# Patient Record
Sex: MALE | Race: WHITE | NOT HISPANIC OR LATINO
[De-identification: names, ages, dates, MRNs, and addresses within clinical notes are randomized per-mention and may not be internally consistent; named-entity substitution may affect disease eponyms.]

---

## 2018-09-14 PROBLEM — Z00.00 ENCOUNTER FOR PREVENTIVE HEALTH EXAMINATION: Status: ACTIVE | Noted: 2018-09-14

## 2018-09-18 ENCOUNTER — NON-APPOINTMENT (OUTPATIENT)
Age: 53
End: 2018-09-18

## 2018-09-18 ENCOUNTER — LABORATORY RESULT (OUTPATIENT)
Age: 53
End: 2018-09-18

## 2018-09-18 ENCOUNTER — APPOINTMENT (OUTPATIENT)
Dept: HEART AND VASCULAR | Facility: CLINIC | Age: 53
End: 2018-09-18
Payer: COMMERCIAL

## 2018-09-18 VITALS
HEIGHT: 71 IN | BODY MASS INDEX: 35 KG/M2 | HEART RATE: 74 BPM | DIASTOLIC BLOOD PRESSURE: 90 MMHG | WEIGHT: 250 LBS | SYSTOLIC BLOOD PRESSURE: 156 MMHG | RESPIRATION RATE: 16 BRPM

## 2018-09-18 DIAGNOSIS — K22.2 ESOPHAGEAL OBSTRUCTION: ICD-10-CM

## 2018-09-18 DIAGNOSIS — R00.2 PALPITATIONS: ICD-10-CM

## 2018-09-18 DIAGNOSIS — Z87.898 PERSONAL HISTORY OF OTHER SPECIFIED CONDITIONS: ICD-10-CM

## 2018-09-18 DIAGNOSIS — Z87.19 PERSONAL HISTORY OF OTHER DISEASES OF THE DIGESTIVE SYSTEM: ICD-10-CM

## 2018-09-18 DIAGNOSIS — Z80.8 FAMILY HISTORY OF MALIGNANT NEOPLASM OF OTHER ORGANS OR SYSTEMS: ICD-10-CM

## 2018-09-18 DIAGNOSIS — R14.3 FLATULENCE: ICD-10-CM

## 2018-09-18 DIAGNOSIS — R06.02 SHORTNESS OF BREATH: ICD-10-CM

## 2018-09-18 DIAGNOSIS — Z86.39 PERSONAL HISTORY OF OTHER ENDOCRINE, NUTRITIONAL AND METABOLIC DISEASE: ICD-10-CM

## 2018-09-18 DIAGNOSIS — Z86.79 PERSONAL HISTORY OF OTHER DISEASES OF THE CIRCULATORY SYSTEM: ICD-10-CM

## 2018-09-18 DIAGNOSIS — R19.8 OTHER SPECIFIED SYMPTOMS AND SIGNS INVOLVING THE DIGESTIVE SYSTEM AND ABDOMEN: ICD-10-CM

## 2018-09-18 PROCEDURE — 93000 ELECTROCARDIOGRAM COMPLETE: CPT

## 2018-09-18 PROCEDURE — 99204 OFFICE O/P NEW MOD 45 MIN: CPT

## 2018-09-18 RX ORDER — DILTIAZEM HYDROCHLORIDE 300 MG/1
300 CAPSULE, COATED, EXTENDED RELEASE ORAL
Refills: 0 | Status: ACTIVE | COMMUNITY

## 2018-09-18 RX ORDER — LOSARTAN POTASSIUM 50 MG/1
50 TABLET, FILM COATED ORAL
Refills: 0 | Status: ACTIVE | COMMUNITY

## 2018-09-19 ENCOUNTER — RESULT REVIEW (OUTPATIENT)
Age: 53
End: 2018-09-19

## 2018-09-19 LAB
ALBUMIN SERPL ELPH-MCNC: 4.9 G/DL
ALP BLD-CCNC: 73 U/L
ALT SERPL-CCNC: 49 U/L
ANION GAP SERPL CALC-SCNC: 21 MMOL/L
AST SERPL-CCNC: 34 U/L
BASOPHILS # BLD AUTO: 0.11 K/UL
BASOPHILS NFR BLD AUTO: 1.4 %
BILIRUB SERPL-MCNC: 0.7 MG/DL
BUN SERPL-MCNC: 13 MG/DL
CALCIUM SERPL-MCNC: 10.5 MG/DL
CHLORIDE SERPL-SCNC: 98 MMOL/L
CO2 SERPL-SCNC: 28 MMOL/L
CREAT SERPL-MCNC: 1.19 MG/DL
EOSINOPHIL # BLD AUTO: 0.12 K/UL
EOSINOPHIL NFR BLD AUTO: 1.5 %
GLUCOSE SERPL-MCNC: 95 MG/DL
HBA1C MFR BLD HPLC: 5.8 %
HCT VFR BLD CALC: 51.5 %
HGB BLD-MCNC: 17.6 G/DL
IMM GRANULOCYTES NFR BLD AUTO: 0.4 %
LYMPHOCYTES # BLD AUTO: 2.83 K/UL
LYMPHOCYTES NFR BLD AUTO: 35.5 %
MAN DIFF?: NORMAL
MCHC RBC-ENTMCNC: 32.2 PG
MCHC RBC-ENTMCNC: 34.2 GM/DL
MCV RBC AUTO: 94.1 FL
MONOCYTES # BLD AUTO: 0.82 K/UL
MONOCYTES NFR BLD AUTO: 10.3 %
NEUTROPHILS # BLD AUTO: 4.06 K/UL
NEUTROPHILS NFR BLD AUTO: 50.9 %
PLATELET # BLD AUTO: 311 K/UL
POTASSIUM SERPL-SCNC: 4 MMOL/L
PROT SERPL-MCNC: 8 G/DL
RBC # BLD: 5.47 M/UL
RBC # FLD: 13 %
SODIUM SERPL-SCNC: 147 MMOL/L
T3RU NFR SERPL: 1.15 INDEX
T4 FREE SERPL-MCNC: 1.4 NG/DL
TSH SERPL-ACNC: 4.19 UIU/ML
WBC # FLD AUTO: 7.97 K/UL

## 2018-10-05 ENCOUNTER — APPOINTMENT (OUTPATIENT)
Dept: HEART AND VASCULAR | Facility: CLINIC | Age: 53
End: 2018-10-05
Payer: COMMERCIAL

## 2018-10-05 VITALS
HEIGHT: 71 IN | HEART RATE: 68 BPM | WEIGHT: 250 LBS | SYSTOLIC BLOOD PRESSURE: 134 MMHG | BODY MASS INDEX: 35 KG/M2 | DIASTOLIC BLOOD PRESSURE: 84 MMHG

## 2018-10-05 PROCEDURE — 93325 DOPPLER ECHO COLOR FLOW MAPG: CPT

## 2018-10-05 PROCEDURE — 93320 DOPPLER ECHO COMPLETE: CPT

## 2018-10-05 PROCEDURE — 93351 STRESS TTE COMPLETE: CPT

## 2018-10-23 ENCOUNTER — APPOINTMENT (OUTPATIENT)
Dept: HEART AND VASCULAR | Facility: CLINIC | Age: 53
End: 2018-10-23
Payer: COMMERCIAL

## 2018-10-23 VITALS
BODY MASS INDEX: 35.56 KG/M2 | SYSTOLIC BLOOD PRESSURE: 120 MMHG | HEIGHT: 71 IN | HEART RATE: 72 BPM | WEIGHT: 254 LBS | DIASTOLIC BLOOD PRESSURE: 86 MMHG | RESPIRATION RATE: 16 BRPM

## 2018-10-23 DIAGNOSIS — R07.89 OTHER CHEST PAIN: ICD-10-CM

## 2018-10-23 PROCEDURE — 99214 OFFICE O/P EST MOD 30 MIN: CPT

## 2018-10-23 RX ORDER — PANTOPRAZOLE SODIUM 40 MG/1
40 GRANULE, DELAYED RELEASE ORAL
Refills: 0 | Status: DISCONTINUED | COMMUNITY
End: 2018-10-23

## 2018-10-24 LAB
ALBUMIN SERPL ELPH-MCNC: 4.5 G/DL
ALP BLD-CCNC: 69 U/L
ALT SERPL-CCNC: 46 U/L
ANION GAP SERPL CALC-SCNC: 16 MMOL/L
AST SERPL-CCNC: 28 U/L
BASOPHILS # BLD AUTO: 0.09 K/UL
BASOPHILS NFR BLD AUTO: 1.3 %
BILIRUB SERPL-MCNC: 0.5 MG/DL
BUN SERPL-MCNC: 11 MG/DL
CALCIUM SERPL-MCNC: 10.4 MG/DL
CHLORIDE SERPL-SCNC: 102 MMOL/L
CHOLEST SERPL-MCNC: 178 MG/DL
CHOLEST/HDLC SERPL: 4.1 RATIO
CO2 SERPL-SCNC: 29 MMOL/L
CREAT SERPL-MCNC: 1.16 MG/DL
EOSINOPHIL # BLD AUTO: 0.09 K/UL
EOSINOPHIL NFR BLD AUTO: 1.3 %
GLUCOSE SERPL-MCNC: 92 MG/DL
HCT VFR BLD CALC: 52.5 %
HDLC SERPL-MCNC: 43 MG/DL
HGB BLD-MCNC: 17 G/DL
IMM GRANULOCYTES NFR BLD AUTO: 0.3 %
LDLC SERPL CALC-MCNC: 102 MG/DL
LYMPHOCYTES # BLD AUTO: 2.03 K/UL
LYMPHOCYTES NFR BLD AUTO: 28.6 %
MAN DIFF?: NORMAL
MCHC RBC-ENTMCNC: 31.8 PG
MCHC RBC-ENTMCNC: 32.4 GM/DL
MCV RBC AUTO: 98.3 FL
MONOCYTES # BLD AUTO: 0.74 K/UL
MONOCYTES NFR BLD AUTO: 10.4 %
NEUTROPHILS # BLD AUTO: 4.14 K/UL
NEUTROPHILS NFR BLD AUTO: 58.1 %
PLATELET # BLD AUTO: 333 K/UL
POTASSIUM SERPL-SCNC: 4.5 MMOL/L
PROT SERPL-MCNC: 8.1 G/DL
RBC # BLD: 5.34 M/UL
RBC # FLD: 13.1 %
SODIUM SERPL-SCNC: 147 MMOL/L
TRIGL SERPL-MCNC: 163 MG/DL
WBC # FLD AUTO: 7.11 K/UL

## 2018-10-26 ENCOUNTER — RESULT REVIEW (OUTPATIENT)
Age: 53
End: 2018-10-26

## 2018-10-26 PROBLEM — R07.89 CHEST PRESSURE: Status: ACTIVE | Noted: 2018-10-26

## 2018-11-07 ENCOUNTER — RESULT REVIEW (OUTPATIENT)
Age: 53
End: 2018-11-07

## 2018-11-07 RX ORDER — METOPROLOL SUCCINATE 100 MG/1
100 TABLET, EXTENDED RELEASE ORAL
Qty: 2 | Refills: 0 | Status: DISCONTINUED | COMMUNITY
Start: 2018-10-26 | End: 2018-11-07

## 2018-11-08 ENCOUNTER — RX RENEWAL (OUTPATIENT)
Age: 53
End: 2018-11-08

## 2018-11-08 ENCOUNTER — RECORD ABSTRACTING (OUTPATIENT)
Age: 53
End: 2018-11-08

## 2018-11-09 ENCOUNTER — MEDICATION RENEWAL (OUTPATIENT)
Age: 53
End: 2018-11-09

## 2018-11-09 RX ORDER — CAMPHOR 0.45 %
25 GEL (GRAM) TOPICAL
Qty: 2 | Refills: 0 | Status: DISCONTINUED | COMMUNITY
Start: 2018-11-08 | End: 2018-11-09

## 2018-11-09 RX ORDER — METHYLPREDNISOLONE 32 MG/1
32 TABLET ORAL
Qty: 2 | Refills: 1 | Status: DISCONTINUED | COMMUNITY
Start: 2018-10-26 | End: 2018-11-09

## 2018-11-12 ENCOUNTER — INPATIENT (INPATIENT)
Facility: HOSPITAL | Age: 53
LOS: 0 days | Discharge: HOME | DRG: 287 | End: 2018-11-13
Attending: INTERNAL MEDICINE | Admitting: INTERNAL MEDICINE
Payer: COMMERCIAL

## 2018-11-12 VITALS
DIASTOLIC BLOOD PRESSURE: 83 MMHG | HEART RATE: 84 BPM | TEMPERATURE: 98 F | HEIGHT: 71 IN | SYSTOLIC BLOOD PRESSURE: 137 MMHG | RESPIRATION RATE: 17 BRPM | WEIGHT: 244.27 LBS | OXYGEN SATURATION: 94 %

## 2018-11-12 DIAGNOSIS — Z88.8 ALLERGY STATUS TO OTHER DRUGS, MEDICAMENTS AND BIOLOGICAL SUBSTANCES: ICD-10-CM

## 2018-11-12 DIAGNOSIS — E78.5 HYPERLIPIDEMIA, UNSPECIFIED: ICD-10-CM

## 2018-11-12 DIAGNOSIS — Z91.89 OTHER SPECIFIED PERSONAL RISK FACTORS, NOT ELSEWHERE CLASSIFIED: ICD-10-CM

## 2018-11-12 DIAGNOSIS — R06.09 OTHER FORMS OF DYSPNEA: ICD-10-CM

## 2018-11-12 DIAGNOSIS — I25.10 ATHEROSCLEROTIC HEART DISEASE OF NATIVE CORONARY ARTERY WITHOUT ANGINA PECTORIS: ICD-10-CM

## 2018-11-12 DIAGNOSIS — R63.8 OTHER SYMPTOMS AND SIGNS CONCERNING FOOD AND FLUID INTAKE: ICD-10-CM

## 2018-11-12 DIAGNOSIS — I10 ESSENTIAL (PRIMARY) HYPERTENSION: ICD-10-CM

## 2018-11-12 DIAGNOSIS — M51.26 OTHER INTERVERTEBRAL DISC DISPLACEMENT, LUMBAR REGION: Chronic | ICD-10-CM

## 2018-11-12 LAB
ALBUMIN SERPL ELPH-MCNC: 4.9 G/DL — SIGNIFICANT CHANGE UP (ref 3.3–5)
ALP SERPL-CCNC: 64 U/L — SIGNIFICANT CHANGE UP (ref 40–120)
ALT FLD-CCNC: 49 U/L — HIGH (ref 10–45)
ANION GAP SERPL CALC-SCNC: 17 MMOL/L — SIGNIFICANT CHANGE UP (ref 5–17)
APTT BLD: 35 SEC — SIGNIFICANT CHANGE UP (ref 27.5–36.3)
AST SERPL-CCNC: 26 U/L — SIGNIFICANT CHANGE UP (ref 10–40)
BASOPHILS NFR BLD AUTO: 0.1 % — SIGNIFICANT CHANGE UP (ref 0–2)
BILIRUB SERPL-MCNC: 0.9 MG/DL — SIGNIFICANT CHANGE UP (ref 0.2–1.2)
BUN SERPL-MCNC: 17 MG/DL — SIGNIFICANT CHANGE UP (ref 7–23)
CALCIUM SERPL-MCNC: 10.1 MG/DL — SIGNIFICANT CHANGE UP (ref 8.4–10.5)
CHLORIDE SERPL-SCNC: 94 MMOL/L — LOW (ref 96–108)
CHOLEST SERPL-MCNC: 201 MG/DL — HIGH (ref 10–199)
CO2 SERPL-SCNC: 22 MMOL/L — SIGNIFICANT CHANGE UP (ref 22–31)
CREAT SERPL-MCNC: 0.92 MG/DL — SIGNIFICANT CHANGE UP (ref 0.5–1.3)
EOSINOPHIL NFR BLD AUTO: 0 % — SIGNIFICANT CHANGE UP (ref 0–6)
GLUCOSE SERPL-MCNC: 163 MG/DL — HIGH (ref 70–99)
HBA1C BLD-MCNC: 5.5 % — SIGNIFICANT CHANGE UP (ref 4–5.6)
HCT VFR BLD CALC: 48.9 % — SIGNIFICANT CHANGE UP (ref 39–50)
HDLC SERPL-MCNC: 57 MG/DL — SIGNIFICANT CHANGE UP
HGB BLD-MCNC: 17.1 G/DL — HIGH (ref 13–17)
INR BLD: 1.09 — SIGNIFICANT CHANGE UP (ref 0.88–1.16)
LIPID PNL WITH DIRECT LDL SERPL: 133 MG/DL — HIGH
LYMPHOCYTES # BLD AUTO: 10.5 % — LOW (ref 13–44)
MAGNESIUM SERPL-MCNC: 2.3 MG/DL — SIGNIFICANT CHANGE UP (ref 1.6–2.6)
MCHC RBC-ENTMCNC: 31.1 PG — SIGNIFICANT CHANGE UP (ref 27–34)
MCHC RBC-ENTMCNC: 35 G/DL — SIGNIFICANT CHANGE UP (ref 32–36)
MCV RBC AUTO: 89.1 FL — SIGNIFICANT CHANGE UP (ref 80–100)
MONOCYTES NFR BLD AUTO: 0.8 % — LOW (ref 2–14)
NEUTROPHILS NFR BLD AUTO: 88.6 % — HIGH (ref 43–77)
PLATELET # BLD AUTO: 305 K/UL — SIGNIFICANT CHANGE UP (ref 150–400)
POTASSIUM SERPL-MCNC: 4 MMOL/L — SIGNIFICANT CHANGE UP (ref 3.5–5.3)
POTASSIUM SERPL-SCNC: 4 MMOL/L — SIGNIFICANT CHANGE UP (ref 3.5–5.3)
PROT SERPL-MCNC: 8.4 G/DL — HIGH (ref 6–8.3)
PROTHROM AB SERPL-ACNC: 12.4 SEC — SIGNIFICANT CHANGE UP (ref 10–12.9)
RBC # BLD: 5.49 M/UL — SIGNIFICANT CHANGE UP (ref 4.2–5.8)
RBC # FLD: 12 % — SIGNIFICANT CHANGE UP (ref 10.3–16.9)
SODIUM SERPL-SCNC: 133 MMOL/L — LOW (ref 135–145)
TOTAL CHOLESTEROL/HDL RATIO MEASUREMENT: 3.5 RATIO — SIGNIFICANT CHANGE UP (ref 3.4–9.6)
TRIGL SERPL-MCNC: 54 MG/DL — SIGNIFICANT CHANGE UP (ref 10–149)
TSH SERPL-MCNC: 1.44 UIU/ML — SIGNIFICANT CHANGE UP (ref 0.35–4.94)
WBC # BLD: 8.8 K/UL — SIGNIFICANT CHANGE UP (ref 3.8–10.5)
WBC # FLD AUTO: 8.8 K/UL — SIGNIFICANT CHANGE UP (ref 3.8–10.5)

## 2018-11-12 PROCEDURE — 71045 X-RAY EXAM CHEST 1 VIEW: CPT | Mod: 26

## 2018-11-12 PROCEDURE — 95018 ALL TSTG PERQ&IQ DRUGS/BIOL: CPT | Mod: GC

## 2018-11-12 PROCEDURE — 99291 CRITICAL CARE FIRST HOUR: CPT

## 2018-11-12 PROCEDURE — 93458 L HRT ARTERY/VENTRICLE ANGIO: CPT | Mod: 26

## 2018-11-12 PROCEDURE — 93010 ELECTROCARDIOGRAM REPORT: CPT

## 2018-11-12 RX ORDER — PANTOPRAZOLE SODIUM 20 MG/1
0.5 TABLET, DELAYED RELEASE ORAL
Qty: 0 | Refills: 0 | COMMUNITY

## 2018-11-12 RX ORDER — CHLORHEXIDINE GLUCONATE 213 G/1000ML
1 SOLUTION TOPICAL DAILY
Qty: 0 | Refills: 0 | Status: DISCONTINUED | OUTPATIENT
Start: 2018-11-12 | End: 2018-11-13

## 2018-11-12 RX ORDER — FAMOTIDINE 10 MG/ML
20 INJECTION INTRAVENOUS ONCE
Qty: 0 | Refills: 0 | Status: COMPLETED | OUTPATIENT
Start: 2018-11-12 | End: 2018-11-12

## 2018-11-12 RX ORDER — PANTOPRAZOLE SODIUM 20 MG/1
20 TABLET, DELAYED RELEASE ORAL
Qty: 0 | Refills: 0 | Status: DISCONTINUED | OUTPATIENT
Start: 2018-11-12 | End: 2018-11-13

## 2018-11-12 RX ORDER — LOSARTAN POTASSIUM 100 MG/1
50 TABLET, FILM COATED ORAL DAILY
Qty: 0 | Refills: 0 | Status: DISCONTINUED | OUTPATIENT
Start: 2018-11-12 | End: 2018-11-13

## 2018-11-12 RX ORDER — INFLUENZA VIRUS VACCINE 15; 15; 15; 15 UG/.5ML; UG/.5ML; UG/.5ML; UG/.5ML
0.5 SUSPENSION INTRAMUSCULAR ONCE
Qty: 0 | Refills: 0 | Status: COMPLETED | OUTPATIENT
Start: 2018-11-12 | End: 2018-11-13

## 2018-11-12 RX ORDER — FAMOTIDINE 10 MG/ML
20 INJECTION INTRAVENOUS ONCE
Qty: 0 | Refills: 0 | Status: DISCONTINUED | OUTPATIENT
Start: 2018-11-12 | End: 2018-11-12

## 2018-11-12 RX ORDER — HEPARIN SODIUM 5000 [USP'U]/ML
5000 INJECTION INTRAVENOUS; SUBCUTANEOUS EVERY 8 HOURS
Qty: 0 | Refills: 0 | Status: DISCONTINUED | OUTPATIENT
Start: 2018-11-13 | End: 2018-11-13

## 2018-11-12 RX ORDER — DILTIAZEM HCL 120 MG
1 CAPSULE, EXT RELEASE 24 HR ORAL
Qty: 0 | Refills: 0 | COMMUNITY

## 2018-11-12 RX ORDER — DILTIAZEM HCL 120 MG
300 CAPSULE, EXT RELEASE 24 HR ORAL DAILY
Qty: 0 | Refills: 0 | Status: DISCONTINUED | OUTPATIENT
Start: 2018-11-12 | End: 2018-11-13

## 2018-11-12 RX ORDER — CLOPIDOGREL BISULFATE 75 MG/1
600 TABLET, FILM COATED ORAL ONCE
Qty: 0 | Refills: 0 | Status: COMPLETED | OUTPATIENT
Start: 2018-11-12 | End: 2018-11-12

## 2018-11-12 RX ORDER — HYDROCORTISONE 20 MG
200 TABLET ORAL ONCE
Qty: 0 | Refills: 0 | Status: DISCONTINUED | OUTPATIENT
Start: 2018-11-12 | End: 2018-11-13

## 2018-11-12 RX ORDER — DIPHENHYDRAMINE HCL 50 MG
50 CAPSULE ORAL ONCE
Qty: 0 | Refills: 0 | Status: DISCONTINUED | OUTPATIENT
Start: 2018-11-12 | End: 2018-11-13

## 2018-11-12 RX ORDER — ATORVASTATIN CALCIUM 80 MG/1
80 TABLET, FILM COATED ORAL AT BEDTIME
Qty: 0 | Refills: 0 | Status: DISCONTINUED | OUTPATIENT
Start: 2018-11-12 | End: 2018-11-13

## 2018-11-12 RX ORDER — DIPHENHYDRAMINE HCL 50 MG
25 CAPSULE ORAL ONCE
Qty: 0 | Refills: 0 | Status: COMPLETED | OUTPATIENT
Start: 2018-11-12 | End: 2018-11-12

## 2018-11-12 RX ORDER — ASPIRIN/CALCIUM CARB/MAGNESIUM 324 MG
325 TABLET ORAL ONCE
Qty: 0 | Refills: 0 | Status: DISCONTINUED | OUTPATIENT
Start: 2018-11-12 | End: 2018-11-12

## 2018-11-12 RX ORDER — HYDROCHLOROTHIAZIDE 25 MG
12.5 TABLET ORAL DAILY
Qty: 0 | Refills: 0 | Status: DISCONTINUED | OUTPATIENT
Start: 2018-11-12 | End: 2018-11-13

## 2018-11-12 RX ORDER — ASPIRIN/CALCIUM CARB/MAGNESIUM 324 MG
81 TABLET ORAL DAILY
Qty: 0 | Refills: 0 | Status: DISCONTINUED | OUTPATIENT
Start: 2018-11-13 | End: 2018-11-13

## 2018-11-12 RX ORDER — EPINEPHRINE 0.3 MG/.3ML
0.3 INJECTION INTRAMUSCULAR; SUBCUTANEOUS ONCE
Qty: 0 | Refills: 0 | Status: DISCONTINUED | OUTPATIENT
Start: 2018-11-12 | End: 2018-11-13

## 2018-11-12 RX ADMIN — FAMOTIDINE 20 MILLIGRAM(S): 10 INJECTION INTRAVENOUS at 08:48

## 2018-11-12 RX ADMIN — CLOPIDOGREL BISULFATE 600 MILLIGRAM(S): 75 TABLET, FILM COATED ORAL at 17:42

## 2018-11-12 RX ADMIN — ATORVASTATIN CALCIUM 80 MILLIGRAM(S): 80 TABLET, FILM COATED ORAL at 21:38

## 2018-11-12 RX ADMIN — Medication 25 MILLIGRAM(S): at 10:00

## 2018-11-12 NOTE — H&P ADULT - PROBLEM SELECTOR PLAN 5
F: no IVF  E: K>4 Mg>2, monitor and replete as needed  N: DASH diet w/ shellfish and peanut allergy, NPO for cath  Ppx: None  D: CCU    FULL CODE T cholesterol 201,   - C/w atorvastatin 80 mg daily

## 2018-11-12 NOTE — H&P ADULT - ASSESSMENT
53M with PMHx of HTN, HLD, intermittent asthma, GERD, Schatzki ring & hiatal hernia, and ASA allergy presents for elective cardiac cath and ASA desensitization.

## 2018-11-12 NOTE — H&P ADULT - PROBLEM SELECTOR PLAN 1
Hx of chest pain, palpitations, and SOB. Cardiac CT revealed mid LAD stenosis of calcificied and non-calcified areas. Admitted for elective cardiac cath.  - F/u PCI

## 2018-11-12 NOTE — H&P ADULT - FAMILY HISTORY
Father  Still living? No  Family history of lung cancer, Age at diagnosis: Age Unknown     Mother  Still living? No  Family history of ovarian cancer, Age at diagnosis: Age Unknown

## 2018-11-12 NOTE — H&P ADULT - PROBLEM SELECTOR PLAN 2
Hx of HTN, on diltiazem, losartan , and HCTZ at home  - Patient currently normotensive, holding BP meds for now until post-procedure

## 2018-11-12 NOTE — H&P ADULT - ATTENDING COMMENTS
Pt is a 54 y/o man c HTN, HLP, asthma, GERD with dyspnea on exertion for eval admitted for elective PCI and asa desensitization.    afebrile, 84, 113/80, 96% RA    Hgb 17.1  Plt 304  INR 1.1    ECg: nsr @ 90    ALL: angioedema to ASA    cCTA with severe mid LAD stenosis    - cont meds, dilt/losartan for cad  - pt started asa desensitization today, first dose given, cont to f/u  - pt for cath afterwards  - EF 60%

## 2018-11-12 NOTE — H&P ADULT - NSHPREVIEWOFSYSTEMS_GEN_ALL_CORE
REVIEW OF SYSTEMS:    CONSTITUTIONAL: Patient denies weakness, fevers or chills  EYES/ENT: Patient denies visual changes; denies vertigo or throat pain   NECK: Patient denies pain or stiffness  RESPIRATORY: +SHORTNESS OF BREATH, patient denies cough, wheezing, hemoptysis  CARDIOVASCULAR: +PALPITATIONS, +CHEST PAIN  GASTROINTESTINAL: Patient denies abdominal or epigastric pain, nausea, vomiting, or hematemesis, diarrhea or constipation, melena or hematochezia.  GENITOURINARY: Patient denies dysuria, frequency or hematuria  MUSCULOSKELETAL: Patient denies myalgia, arthralgia  NEUROLOGICAL: Patient denies numbness or weakness  SKIN: Patient denies itching, burning, rashes, or lesions   All other review of systems is negative unless indicated above.

## 2018-11-12 NOTE — H&P ADULT - HISTORY OF PRESENT ILLNESS
53M with PMHx of HTN, HLD, intermittent asthma, GERD, Schatzki ring & hiatal hernia, and ASA allergy presents for elective cardiac cath. Patient states for the past month, he has been experiencing shortness of breath on exertion, after 5-6 steps or 1 flight of stairs. Associated with palpitations and intermittent L sided chest pain which was different than the usual heartburn/reflux pain he gets. Outpatient EKG, stress test, holter (5 days) were nonsignificant for acute ischemic findings. Patient underwent on 11/5 Cardiac CT, which revealed severe stenosis of Mid LAD due to calcific and non-calcific plaque. Patient denies any other constitutional symptoms, denies f/c/n/v, changes in BM, changes in urinary habits, abdominal pain. ROS + for intermittent palpitations, SOB, and chest pain. Of note is patient has ASA allergy, reported 30+ years ago, patient had diffuse wheezes, rhinitis, angioedema, and hives.    Vitals wnl, labs notable for Hb 17.1, T cholesterol 201, . Patient is admitted to CCU for elective cardiac cath with ASA desensitization.

## 2018-11-12 NOTE — CHART NOTE - NSCHARTNOTEFT_GEN_A_CORE
ASPIRIN DESENSITIZATION    PATIENT REPORTELY HAS ASA -INDUCED: __angioedema__, he has a hx of asthma , but sxs after asa exposure was not consistent with asthma exacerbation.   NO HISTORY OF ASA INDUCED ANYPHYLACTOID (SYNCOPE, HYPOTENSION, NAUSEA/VOMITING)       pt has been premedicated with montelukast.   pt rec'd solumedrol 32mg po last night.   pt rec'd benadryl 25mg this morning.     all beta blockers have been held for >24hrs      subjective/  currently pt DENIES:  Increased nasal congestion or stuffiness  Watery, itchy, or red eyes  Frontal headache or sensation of sinus pain  Headache or facial pain/pressure  Cough, wheezing, or “tightness” in the chest     Informed consent was obtained and time-out was performed.    time                  symptoms/signs:  0 min       =   no reaction   with 1mg asa  20 min     =   no reaction with 3mg asa  50 min     = no reaction with  10mg asa  70 min    =  no reaction with 20mg asa  90 min   =   no reaction with 40 mg asa   110 min   = no reaction with 80 mg asa   130min = no reaction with 160 mg asa    pt received total dose of 314 mg of asa  tolerated well with out issues     ICU Vital Signs Last 24 Hrs  T(C): 37 (12 Nov 2018 16:33), Max: 37.1 (12 Nov 2018 10:00)  T(F): 98.6 (12 Nov 2018 16:33), Max: 98.7 (12 Nov 2018 10:00)  HR: 70 (12 Nov 2018 16:00) (66 - 94)  BP: 117/59 (12 Nov 2018 16:00) (111/65 - 138/86)  BP(mean): 77 (12 Nov 2018 16:00) (77 - 102)  ABP: --  ABP(mean): --  RR: 14 (12 Nov 2018 16:00) (14 - 20)  SpO2: 93% (12 Nov 2018 16:00) (93% - 97%)      Constitutional: well appearing  Eyes: non injected  ENMT: no lip/tongue or facial edema  Neck: no stridor   Respiratory:cta bl  Cardiovascular: s1 s2 nml, rrr  Gastrointestinal: soft (+) bs nt nd   Extremities: no edema  Skin:no rash, urticaria       Procedure was performed successfully without complications.  * ASPIRIN DESENSITIZATION HAS BEEN SUCCESSFUL. PATIENT CAN BE STARTED ON ASA,  UP TO__81____MG DAILY  *IF 2 DOSES OF ASA ARE MISSED, PATIENT WILL NEED TO GO THROUGH ASA DESENSITIZATION AGAIN.     Results were explained to the patient and communicated with primary team  Procedure was performed by Dr. Stanford Saha  Time face to face 200 minutes with >50% on counseling and coordination of care.

## 2018-11-12 NOTE — H&P ADULT - PMH
Chest pain, unspecified type    Duodenal ulcer    Dyspnea on exertion    Essential hypertension    Gastroesophageal reflux disease, esophagitis presence not specified    Hiatal hernia    Mild intermittent asthma without complication    Palpitation    Schatzki's ring

## 2018-11-12 NOTE — H&P ADULT - PROBLEM SELECTOR PLAN 6
1) PCP Contacted on Admission: (Y/N) --> Name & Phone #:  2) Date of Contact with PCP:  3) PCP Contacted at Discharge: (Y/N, N/A)  4) Summary of Handoff Given to PCP:   5) Post-Discharge Appointment Date and Location: F: no IVF  E: K>4 Mg>2, monitor and replete as needed  N: DASH diet w/ shellfish and peanut allergy, NPO for cath  Ppx: None  D: CCU    FULL CODE

## 2018-11-12 NOTE — PATIENT PROFILE ADULT - NSPROEDALEARNPREF_GEN_A_NUR
computer/internet/individual instruction/written material/audio/skill demonstration/verbal instruction

## 2018-11-12 NOTE — H&P ADULT - NSHPSOCIALHISTORY_GEN_ALL_CORE
Patient smokes 1 cigar every 5 years, drinks a nightcap every few days, denies any illicit drug use. Works as  for [employer].

## 2018-11-12 NOTE — H&P ADULT - PROBLEM SELECTOR PLAN 3
30 years ago patient had angioedema, shortness of breath, anaphylaxis 2/2 ASA. S/p montelukast 10 mg, and methylprednisolone  - C/w aspirin desensitization protocol  - epinephrine, benadryl at bedside

## 2018-11-12 NOTE — H&P ADULT - NSHPLABSRESULTS_GEN_ALL_CORE
LABS:                         17.1   8.8   )-----------( 305      ( 12 Nov 2018 08:07 )             48.9     11-12    133<L>  |  94<L>  |  17  ----------------------------<  163<H>  4.0   |  22  |  0.92    Ca    10.1      12 Nov 2018 08:07  Mg     2.3     11-12    TPro  8.4<H>  /  Alb  4.9  /  TBili  0.9  /  DBili  x   /  AST  26  /  ALT  49<H>  /  AlkPhos  64  11-12    PT/INR - ( 12 Nov 2018 08:07 )   PT: 12.4 sec;   INR: 1.09          PTT - ( 12 Nov 2018 08:07 )  PTT:35.0 sec        RADIOLOGY, EKG & ADDITIONAL TESTS: Reviewed.

## 2018-11-12 NOTE — H&P ADULT - NSHPPHYSICALEXAM_GEN_ALL_CORE
VITAL SIGNS:  T(C): 36.7 (11-12-18 @ 07:48), Max: 36.7 (11-12-18 @ 07:48)  T(F): 98.1 (11-12-18 @ 07:48), Max: 98.1 (11-12-18 @ 07:48)  HR: 74 (11-12-18 @ 11:00) (74 - 94)  BP: 132/79 (11-12-18 @ 11:00) (113/80 - 138/86)  BP(mean): 97 (11-12-18 @ 11:00) (90 - 102)  RR: 19 (11-12-18 @ 11:00) (16 - 20)  SpO2: 94% (11-12-18 @ 11:00) (93% - 97%)  Wt(kg): --    PHYSICAL EXAM:  Constitutional: WDWN, lying comfortably in bed, NAD  Head: Nc/At  Eyes: PERRL, EOMI, clear conjunctiva  ENT: no nasal discharge; uvula midline, no oropharyngeal erythema or exudates; MMM  Neck: supple; no JVD or thyromegaly  Respiratory: CTA b/l, no wheezes, rales, or rhonchi  Cardiac: +S1/S2, +RRR, no murmurs, rubs, or gallops  Gastrointestinal: Obese abdomen, soft, non-tender, non-distended, no rebound/guarding, no palpable masses, normoactive bowel sounds x4  Extremities: WWP, no clubbing or cyanosis, no peripheral edema  Musculoskeletal: NROM x4; no joint swelling, tenderness or erythema  Vascular: 2+ radial and DP pulses b/l  Dermatologic: skin warm, dry and intact, no rashes, wounds, or scars  Lymphatic: no submandibular or cervical LAD  Neurologic: AAOx3, CNII-XII grossly intact, no focal deficits, 5/5 strength b/l UE and LE

## 2018-11-13 ENCOUNTER — TRANSCRIPTION ENCOUNTER (OUTPATIENT)
Age: 53
End: 2018-11-13

## 2018-11-13 VITALS
DIASTOLIC BLOOD PRESSURE: 70 MMHG | SYSTOLIC BLOOD PRESSURE: 111 MMHG | OXYGEN SATURATION: 93 % | HEART RATE: 60 BPM | RESPIRATION RATE: 18 BRPM

## 2018-11-13 LAB
ANION GAP SERPL CALC-SCNC: 17 MMOL/L — SIGNIFICANT CHANGE UP (ref 5–17)
BUN SERPL-MCNC: 21 MG/DL — SIGNIFICANT CHANGE UP (ref 7–23)
CALCIUM SERPL-MCNC: 9.5 MG/DL — SIGNIFICANT CHANGE UP (ref 8.4–10.5)
CHLORIDE SERPL-SCNC: 96 MMOL/L — SIGNIFICANT CHANGE UP (ref 96–108)
CO2 SERPL-SCNC: 25 MMOL/L — SIGNIFICANT CHANGE UP (ref 22–31)
CREAT SERPL-MCNC: 1.01 MG/DL — SIGNIFICANT CHANGE UP (ref 0.5–1.3)
GLUCOSE SERPL-MCNC: 118 MG/DL — HIGH (ref 70–99)
HCT VFR BLD CALC: 47.3 % — SIGNIFICANT CHANGE UP (ref 39–50)
HGB BLD-MCNC: 16.1 G/DL — SIGNIFICANT CHANGE UP (ref 13–17)
MAGNESIUM SERPL-MCNC: 2.6 MG/DL — SIGNIFICANT CHANGE UP (ref 1.6–2.6)
MCHC RBC-ENTMCNC: 31 PG — SIGNIFICANT CHANGE UP (ref 27–34)
MCHC RBC-ENTMCNC: 34 G/DL — SIGNIFICANT CHANGE UP (ref 32–36)
MCV RBC AUTO: 91.1 FL — SIGNIFICANT CHANGE UP (ref 80–100)
PLATELET # BLD AUTO: 316 K/UL — SIGNIFICANT CHANGE UP (ref 150–400)
POTASSIUM SERPL-MCNC: 3.9 MMOL/L — SIGNIFICANT CHANGE UP (ref 3.5–5.3)
POTASSIUM SERPL-SCNC: 3.9 MMOL/L — SIGNIFICANT CHANGE UP (ref 3.5–5.3)
RBC # BLD: 5.19 M/UL — SIGNIFICANT CHANGE UP (ref 4.2–5.8)
RBC # FLD: 12.4 % — SIGNIFICANT CHANGE UP (ref 10.3–16.9)
SODIUM SERPL-SCNC: 138 MMOL/L — SIGNIFICANT CHANGE UP (ref 135–145)
WBC # BLD: 16.8 K/UL — HIGH (ref 3.8–10.5)
WBC # FLD AUTO: 16.8 K/UL — HIGH (ref 3.8–10.5)

## 2018-11-13 PROCEDURE — C1887: CPT

## 2018-11-13 PROCEDURE — 83735 ASSAY OF MAGNESIUM: CPT

## 2018-11-13 PROCEDURE — 85610 PROTHROMBIN TIME: CPT

## 2018-11-13 PROCEDURE — 83036 HEMOGLOBIN GLYCOSYLATED A1C: CPT

## 2018-11-13 PROCEDURE — 85730 THROMBOPLASTIN TIME PARTIAL: CPT

## 2018-11-13 PROCEDURE — 85025 COMPLETE CBC W/AUTO DIFF WBC: CPT

## 2018-11-13 PROCEDURE — 85027 COMPLETE CBC AUTOMATED: CPT

## 2018-11-13 PROCEDURE — C1894: CPT

## 2018-11-13 PROCEDURE — 71045 X-RAY EXAM CHEST 1 VIEW: CPT | Mod: 26

## 2018-11-13 PROCEDURE — 80053 COMPREHEN METABOLIC PANEL: CPT

## 2018-11-13 PROCEDURE — 36415 COLL VENOUS BLD VENIPUNCTURE: CPT

## 2018-11-13 PROCEDURE — 90686 IIV4 VACC NO PRSV 0.5 ML IM: CPT

## 2018-11-13 PROCEDURE — 84443 ASSAY THYROID STIM HORMONE: CPT

## 2018-11-13 PROCEDURE — C1769: CPT

## 2018-11-13 PROCEDURE — 80048 BASIC METABOLIC PNL TOTAL CA: CPT

## 2018-11-13 PROCEDURE — 93005 ELECTROCARDIOGRAM TRACING: CPT

## 2018-11-13 PROCEDURE — 71045 X-RAY EXAM CHEST 1 VIEW: CPT

## 2018-11-13 PROCEDURE — 80061 LIPID PANEL: CPT

## 2018-11-13 RX ORDER — LOSARTAN POTASSIUM 100 MG/1
1 TABLET, FILM COATED ORAL
Qty: 0 | Refills: 0 | COMMUNITY

## 2018-11-13 RX ORDER — ATORVASTATIN CALCIUM 80 MG/1
40 TABLET, FILM COATED ORAL AT BEDTIME
Qty: 0 | Refills: 0 | Status: DISCONTINUED | OUTPATIENT
Start: 2018-11-13 | End: 2018-11-13

## 2018-11-13 RX ORDER — ATORVASTATIN CALCIUM 80 MG/1
1 TABLET, FILM COATED ORAL
Qty: 0 | Refills: 0 | COMMUNITY

## 2018-11-13 RX ORDER — POTASSIUM CHLORIDE 20 MEQ
20 PACKET (EA) ORAL ONCE
Qty: 0 | Refills: 0 | Status: COMPLETED | OUTPATIENT
Start: 2018-11-13 | End: 2018-11-13

## 2018-11-13 RX ORDER — ASPIRIN/CALCIUM CARB/MAGNESIUM 324 MG
1 TABLET ORAL
Qty: 30 | Refills: 1 | OUTPATIENT
Start: 2018-11-13 | End: 2019-01-11

## 2018-11-13 RX ORDER — LOSARTAN POTASSIUM 100 MG/1
1 TABLET, FILM COATED ORAL
Qty: 0 | Refills: 0 | COMMUNITY
Start: 2018-11-13

## 2018-11-13 RX ORDER — ATORVASTATIN CALCIUM 80 MG/1
1 TABLET, FILM COATED ORAL
Qty: 30 | Refills: 1 | OUTPATIENT
Start: 2018-11-13 | End: 2019-01-11

## 2018-11-13 RX ADMIN — Medication 81 MILLIGRAM(S): at 11:10

## 2018-11-13 RX ADMIN — INFLUENZA VIRUS VACCINE 0.5 MILLILITER(S): 15; 15; 15; 15 SUSPENSION INTRAMUSCULAR at 13:25

## 2018-11-13 RX ADMIN — CHLORHEXIDINE GLUCONATE 1 APPLICATION(S): 213 SOLUTION TOPICAL at 06:00

## 2018-11-13 RX ADMIN — PANTOPRAZOLE SODIUM 20 MILLIGRAM(S): 20 TABLET, DELAYED RELEASE ORAL at 05:28

## 2018-11-13 RX ADMIN — Medication 300 MILLIGRAM(S): at 05:28

## 2018-11-13 RX ADMIN — Medication 12.5 MILLIGRAM(S): at 05:28

## 2018-11-13 RX ADMIN — Medication 20 MILLIEQUIVALENT(S): at 08:28

## 2018-11-13 RX ADMIN — HEPARIN SODIUM 5000 UNIT(S): 5000 INJECTION INTRAVENOUS; SUBCUTANEOUS at 05:28

## 2018-11-13 RX ADMIN — LOSARTAN POTASSIUM 50 MILLIGRAM(S): 100 TABLET, FILM COATED ORAL at 05:28

## 2018-11-13 NOTE — DISCHARGE NOTE ADULT - NS AS ACTIVITY OBS
Walking-Outdoors allowed/Walking-Indoors allowed/Return to Work/School allowed/Please advance your activity as tolerated.

## 2018-11-13 NOTE — DISCHARGE NOTE ADULT - SECONDARY DIAGNOSIS.
Essential hypertension Gastroesophageal reflux disease, esophagitis presence not specified Hyperlipidemia

## 2018-11-13 NOTE — CHART NOTE - NSCHARTNOTEFT_GEN_A_CORE
Patient does not have a documented allergy for eggs. Patient reports that whenever he eats eggs, he gets some stomach/chest tightness. Patient has never experienced any cutaneous and/or respiratory symptoms from eating eggs. Patient also states he received the flu vaccine February 2018 with no major complication. Patient was explained risks and benefits of receiving the flu vaccine, and was told that even those with an egg allergy, AAAAI guidelines and recommendations state that it is safe to administer flu vaccine (minimal egg extract content) in the setting of those with reported egg allergy. I will be administering his vaccine upon discharge today. Patient does not have a documented allergy for eggs. Patient reports that whenever he eats eggs, he gets some stomach/chest tightness. Patient has never experienced any cutaneous and/or respiratory symptoms from eating eggs. Patient eats fried eggs at home, and this morning, patient ate an breakfast burrito containing eggs with no issues. Patient also states he received the flu vaccine February 2018 with no major complication. Patient was explained risks and benefits of receiving the flu vaccine, and was told that even those with an egg allergy, [facility] guidelines and recommendations state that it is safe to administer flu vaccine (minimal egg extract content) in the setting of those with reported egg allergy. I will be administering his flu vaccine upon discharge today.

## 2018-11-13 NOTE — DISCHARGE NOTE ADULT - INSTRUCTIONS
Please continue a healthy and balanced diet that is low in sodium and cholesterol. Please limit your intake of excessively fatty/fried and sugary foods.

## 2018-11-13 NOTE — DISCHARGE NOTE ADULT - CARE PLAN
Principal Discharge DX:	Coronary artery disease due to calcified coronary lesion  Goal:	Limit disease progression and symptom development.  Assessment and plan of treatment:	You were noted to have a history of shortness of breath on exertion, chest pain, and palpitations. You had previous EKGs, echocardiograms, stress test, and cardiac CT performed, and were subsequently scheduled for a coronary catheter procedure at [facility]. There were no significant stenotic lesions noted in your blood vessels and therefore, no stent was placed. Please continue to take your aspirin 81 mg daily, and note that your atorvastatin dose is now 40 mg daily. Please follow-up with Dr. Basilio at your scheduled appointment below.  Secondary Diagnosis:	Essential hypertension  Goal:	Limit disease progression and symptom development.  Assessment and plan of treatment:	You were noted to have a history of hypertension, or high blood pressure. Please continue to take your losartan 50 mg daily, diltiazem 300 mg daily, hydrochlorothiazid 12.5 mg daily (half of your 25 mg pill), and follow-up with Dr. Basilio at your scheduled appointment below.  Secondary Diagnosis:	Gastroesophageal reflux disease, esophagitis presence not specified  Goal:	Limit disease progression and symptom development.  Assessment and plan of treatment:	You were noted to have a history of dyspepsia and gastroesophageal reflux disease (GERD). As discussed, you may continue your pantoprazole 20 mg daily. Please discuss with Dr. Basilio or Dr. Noyola regarding the continuation of this medication.  Secondary Diagnosis:	Hyperlipidemia  Goal:	Limit disease progression and symptom development.  Assessment and plan of treatment:	You were noted to have a history of high cholesterol/triglycerides. Your total cholesterol was noted to be elevated at 201 and LDL cholesterol elevated 133. Please continue to take your atorvastatin 40 mg daily.

## 2018-11-13 NOTE — DISCHARGE NOTE ADULT - PLAN OF CARE
Limit disease progression and symptom development. You were noted to have a history of shortness of breath on exertion, chest pain, and palpitations. You had previous EKGs, echocardiograms, stress test, and cardiac CT performed, and were subsequently scheduled for a coronary catheter procedure at [facility]. There were no significant stenotic lesions noted in your blood vessels and therefore, no stent was placed. Please continue to take your aspirin 81 mg daily, and note that your atorvastatin dose is now 40 mg daily. Please follow-up with Dr. Basilio at your scheduled appointment below. You were noted to have a history of hypertension, or high blood pressure. Please continue to take your losartan 50 mg daily, diltiazem 300 mg daily, hydrochlorothiazid 12.5 mg daily (half of your 25 mg pill), and follow-up with Dr. Basilio at your scheduled appointment below. You were noted to have a history of dyspepsia and gastroesophageal reflux disease (GERD). As discussed, you may continue your pantoprazole 20 mg daily. Please discuss with Dr. Basilio or Dr. Noyola regarding the continuation of this medication. You were noted to have a history of high cholesterol/triglycerides. Your total cholesterol was noted to be elevated at 201 and LDL cholesterol elevated 133. Please continue to take your atorvastatin 40 mg daily.

## 2018-11-13 NOTE — DISCHARGE NOTE ADULT - CARE PROVIDER_API CALL
Lennie Basilio), Cardiovascular Disease; Internal Medicine  [address] [place] [address]  Phone: [phone]  Fax: [phone]

## 2018-11-13 NOTE — DISCHARGE NOTE ADULT - HOSPITAL COURSE
53M with PMHx of HTN, HLD, intermittent asthma, GERD, Schatzki ring & hiatal hernia, and ASA allergy presents for elective cardiac cath. Patient states for the past month, he has been experiencing shortness of breath on exertion, after 5-6 steps or 1 flight of stairs. Associated with palpitations and intermittent L sided chest pain which was different than the usual heartburn/reflux pain he gets. Outpatient EKG, stress test, holter (5 days) were nonsignificant for acute ischemic findings. Patient underwent on 11/5 Cardiac CT, which revealed severe stenosis of Mid LAD due to calcific and non-calcific plaque. Patient denies any other constitutional symptoms, denies f/c/n/v, changes in BM, changes in urinary habits, abdominal pain. ROS + for intermittent palpitations, SOB, and chest pain. Of note is patient has ASA allergy, reported 30+ years ago, patient had diffuse wheezes, rhinitis, angioedema, and hives. Admission vitals wnl, labs notable for Hb 17.1, T cholesterol 201, . Patient is admitted to CCU for elective cardiac cath with ASA desensitization. Patient was pre-treated with montelukast and solu-cortef, and subsequently desensitized to aspirin with desensitization protocol, though collateral history revealed patient was taking valorie-seltzer (which contains aspirin) with no issues. Underwent cardiac cath on 11/12, no significant stenotic lesions found, LAD stenosis 30%, no stent placed. New medications for the patient: Lipitor increased 20 to 40 mg daily, and aspirin 81 mg daily. Patient is hemodynamically and electrically stable for discharge with follow-up with outpatient cardiologist Dr. Basilio at her office on December 4th.

## 2018-11-13 NOTE — DISCHARGE NOTE ADULT - MEDICATION SUMMARY - MEDICATIONS TO TAKE
I will START or STAY ON the medications listed below when I get home from the hospital:    aspirin 81 mg oral tablet, chewable  -- 1 tab(s) by mouth once a day  -- Indication: For CAD    losartan 50 mg oral tablet  -- 1 tab(s) by mouth once a day  -- Indication: For Essential hypertension    DilTIAZem Hydrochloride  mg/24 hours oral capsule, extended release  -- 1 cap(s) by mouth once a day  -- Indication: For Essential hypertension    Lipitor 40 mg oral tablet  -- 1 tab(s) by mouth once a day (at bedtime)  -- Indication: For CAD    hydroCHLOROthiazide 25 mg oral tablet  -- 0.5 tab(s) by mouth once a day  -- Indication: For Essential hypertension    pantoprazole 40 mg oral delayed release tablet  -- 0.5 tab(s) by mouth once a day  -- Indication: For Gastroesophageal reflux disease, esophagitis presence not specified

## 2018-11-13 NOTE — DISCHARGE NOTE ADULT - SPECIFY PREVIOUS SERIOUS REACTION TO FLU VACCINE:
patient will discuss with pmd  patient gets chest pain if eats eggs ,patient was sick after receiving flu vaccine in feb patient will discuss with pmd  patient gets chest pain if eats eggs ,patient was sick after receiving flu vaccine in feb spoke Dr casanova agreed to take flu vaccine prior to discharge patient will discuss with pmd  patient gets chest pain if eats eggs ,patient was sick after receiving flu vaccine in feb spoke Dr dow agreed to take flu vaccine prior     to discharge flu vaccine given by Dr Dow left deltoid

## 2018-11-13 NOTE — DISCHARGE NOTE ADULT - PATIENT PORTAL LINK FT
You can access the [facility] Patient Portal, offered by [facility], by registering with the following website: [email or web address]

## 2018-11-13 NOTE — DISCHARGE NOTE ADULT - ADDITIONAL INSTRUCTIONS
Please follow-up with Dr. Basilio at her office after you leave the hospital. Her next available appointment is December 4, 2018 at 2:45 PM. Should you experience any symptoms such as, but not limited to: worsening chest pain, shortness of breath, palpitations, etc. please go return to the emergency department immediately.

## 2018-11-13 NOTE — PROGRESS NOTE ADULT - ATTENDING COMMENTS
Pt is a 52 y/o man c HTN, HLP, asthma, GERD with dyspnea on exertion for eval admitted for elective PCI and asa desensitization.    afebrile , VSS    ECg: nsr @ 90    ALL: angioedema to ASA    cCTA with severe mid LAD stenosis    - cont meds, dilt/losartan for cad  - pt completed desensitization and  LAD 30%  - stable for d/c home

## 2018-11-17 DIAGNOSIS — I25.10 ATHEROSCLEROTIC HEART DISEASE OF NATIVE CORONARY ARTERY WITHOUT ANGINA PECTORIS: ICD-10-CM

## 2018-11-17 DIAGNOSIS — Z91.013 ALLERGY TO SEAFOOD: ICD-10-CM

## 2018-11-17 DIAGNOSIS — K44.9 DIAPHRAGMATIC HERNIA WITHOUT OBSTRUCTION OR GANGRENE: ICD-10-CM

## 2018-11-17 DIAGNOSIS — Z88.6 ALLERGY STATUS TO ANALGESIC AGENT: ICD-10-CM

## 2018-11-17 DIAGNOSIS — K22.2 ESOPHAGEAL OBSTRUCTION: ICD-10-CM

## 2018-11-17 DIAGNOSIS — J45.20 MILD INTERMITTENT ASTHMA, UNCOMPLICATED: ICD-10-CM

## 2018-11-17 DIAGNOSIS — K21.9 GASTRO-ESOPHAGEAL REFLUX DISEASE WITHOUT ESOPHAGITIS: ICD-10-CM

## 2018-11-17 DIAGNOSIS — I10 ESSENTIAL (PRIMARY) HYPERTENSION: ICD-10-CM

## 2018-11-17 DIAGNOSIS — E78.5 HYPERLIPIDEMIA, UNSPECIFIED: ICD-10-CM

## 2018-12-11 ENCOUNTER — APPOINTMENT (OUTPATIENT)
Dept: HEART AND VASCULAR | Facility: CLINIC | Age: 53
End: 2018-12-11
Payer: COMMERCIAL

## 2018-12-11 VITALS
BODY MASS INDEX: 34.16 KG/M2 | WEIGHT: 244 LBS | HEART RATE: 68 BPM | RESPIRATION RATE: 14 BRPM | DIASTOLIC BLOOD PRESSURE: 84 MMHG | SYSTOLIC BLOOD PRESSURE: 132 MMHG | HEIGHT: 71 IN

## 2018-12-11 PROBLEM — R06.09 OTHER FORMS OF DYSPNEA: Chronic | Status: ACTIVE | Noted: 2018-11-12

## 2018-12-11 PROBLEM — R00.2 PALPITATIONS: Chronic | Status: ACTIVE | Noted: 2018-11-12

## 2018-12-11 PROBLEM — K26.9 DUODENAL ULCER, UNSPECIFIED AS ACUTE OR CHRONIC, WITHOUT HEMORRHAGE OR PERFORATION: Chronic | Status: ACTIVE | Noted: 2018-11-12

## 2018-12-11 PROBLEM — J45.20 MILD INTERMITTENT ASTHMA, UNCOMPLICATED: Chronic | Status: ACTIVE | Noted: 2018-11-12

## 2018-12-11 PROBLEM — K44.9 DIAPHRAGMATIC HERNIA WITHOUT OBSTRUCTION OR GANGRENE: Chronic | Status: ACTIVE | Noted: 2018-11-12

## 2018-12-11 PROBLEM — K21.9 GASTRO-ESOPHAGEAL REFLUX DISEASE WITHOUT ESOPHAGITIS: Chronic | Status: ACTIVE | Noted: 2018-11-12

## 2018-12-11 PROBLEM — K22.2 ESOPHAGEAL OBSTRUCTION: Chronic | Status: ACTIVE | Noted: 2018-11-12

## 2018-12-11 PROBLEM — R07.9 CHEST PAIN, UNSPECIFIED: Chronic | Status: ACTIVE | Noted: 2018-11-12

## 2018-12-11 PROBLEM — I10 ESSENTIAL (PRIMARY) HYPERTENSION: Chronic | Status: ACTIVE | Noted: 2018-11-12

## 2018-12-11 PROCEDURE — 99213 OFFICE O/P EST LOW 20 MIN: CPT

## 2018-12-11 RX ORDER — ATORVASTATIN CALCIUM 40 MG/1
40 TABLET, FILM COATED ORAL
Refills: 0 | Status: ACTIVE | COMMUNITY

## 2018-12-11 RX ORDER — HYDROCHLOROTHIAZIDE 12.5 MG/1
12.5 CAPSULE ORAL
Refills: 0 | Status: DISCONTINUED | COMMUNITY
End: 2018-12-11

## 2018-12-11 RX ORDER — ASPIRIN ENTERIC COATED TABLETS 81 MG 81 MG/1
81 TABLET, DELAYED RELEASE ORAL
Refills: 0 | Status: ACTIVE | COMMUNITY
Start: 2018-12-11

## 2018-12-11 RX ORDER — HYDROCHLOROTHIAZIDE 25 MG/1
25 TABLET ORAL
Qty: 90 | Refills: 3 | Status: ACTIVE | COMMUNITY
Start: 2018-12-11

## 2018-12-11 RX ORDER — FAMOTIDINE 20 MG/1
20 TABLET, FILM COATED ORAL TWICE DAILY
Refills: 0 | Status: ACTIVE | COMMUNITY

## 2018-12-11 RX ORDER — ALCOHOL 62 ML/100ML
10 LIQUID TOPICAL
Qty: 2 | Refills: 0 | Status: DISCONTINUED | COMMUNITY
Start: 2018-11-08 | End: 2018-12-11

## 2018-12-11 RX ORDER — MONTELUKAST 10 MG/1
10 TABLET, FILM COATED ORAL
Qty: 2 | Refills: 0 | Status: DISCONTINUED | COMMUNITY
Start: 2018-11-08 | End: 2018-12-11

## 2018-12-11 RX ORDER — PANTOPRAZOLE 40 MG/1
40 TABLET, DELAYED RELEASE ORAL DAILY
Qty: 90 | Refills: 3 | Status: DISCONTINUED | COMMUNITY
End: 2018-12-11

## 2018-12-11 NOTE — PHYSICAL EXAM
[General Appearance - Well Developed] : well developed [Normal Appearance] : normal appearance [Well Groomed] : well groomed [General Appearance - Well Nourished] : well nourished [No Deformities] : no deformities [General Appearance - In No Acute Distress] : no acute distress [Normal Oral Mucosa] : normal oral mucosa [No Oral Pallor] : no oral pallor [No Oral Cyanosis] : no oral cyanosis [Normal Jugular Venous A Waves Present] : normal jugular venous A waves present [Normal Jugular Venous V Waves Present] : normal jugular venous V waves present [No Jugular Venous Mabry A Waves] : no jugular venous mabry A waves [Respiration, Rhythm And Depth] : normal respiratory rhythm and effort [Exaggerated Use Of Accessory Muscles For Inspiration] : no accessory muscle use [Auscultation Breath Sounds / Voice Sounds] : lungs were clear to auscultation bilaterally [Heart Rate And Rhythm] : heart rate and rhythm were normal [Heart Sounds] : normal S1 and S2 [Murmurs] : no murmurs present [Abdomen Soft] : soft [Abdomen Tenderness] : non-tender [Abdomen Mass (___ Cm)] : no abdominal mass palpated [Nail Clubbing] : no clubbing of the fingernails [Cyanosis, Localized] : no localized cyanosis [Petechial Hemorrhages (___cm)] : no petechial hemorrhages [] : no ischemic changes [Oriented To Time, Place, And Person] : oriented to person, place, and time [Affect] : the affect was normal [Mood] : the mood was normal [No Anxiety] : not feeling anxious

## 2018-12-11 NOTE — REASON FOR VISIT
[Follow-Up - Clinic] : a clinic follow-up of [FreeTextEntry1] : 53 year old M with history of HTN, HL, Schatzkis ring/GERD, Hernia here for followup after recent cath. Right radial site has healed well.  Has lost 12 lbs since last visit. Says chest pressure has subsided somewhat with weight loss. To go for endoscopy and colonoscopy on Monday. \par \par Sleep Study - Mild MARCELO\par \par CCTA - interpreted as severe LAD mid lesion; CS 19\par \par Cath 2018: LM Normal; LAD proximal 30% stenosis tubular and mild diffuse disease LCX and RCA. LVEF 60%; LVEDP 10 mm Hg. \par \par Labs 2018: CBC/CMP WNL\par Labs 10/24/2018:  (nonfasting); total chol 178; HDL 43; \par Labs 2018: Hgb 17.6; HCT 51.5; otherwise normal. A1C 5.8; ALT 49; Na 147; Cr normal\par \par Holter 10/06/2018: SR with APC\par EXSE 10/05/2018: 8 min 45 secs; reached 97% of MPRH; Frequent PVCs noted in recovery otherwise no EKG changes. No WMA on post exercise images. \par Resting echo: Trace MR; LVEF 60%; Grade 1 DD\par \par Family History: Mother  of mass at age 52 and Father  of Brain Tumor at age 60. Two sons with HTN. \par \par Labs Dec 2017: PSA WNL; total chol 166; ; HDL 42; LDL 98; CBC/CMP WNL. \par EKG today: NSR at 73 bpm without STT abnormalities - no change from prior EKG. \par Echo 2013 LVEF > 55% with trace TR. \par Treadmill Stress test : 8 min 6 secs; Reached 93% of MPRH. No EKG Changes. Normal BP Occasional PVC. \par \par EKG Dec 2017: NSR at 71 bpm without STT abnormalities.

## 2018-12-11 NOTE — DISCUSSION/SUMMARY
[Hyperlipidemia] : hyperlipidemia [Stable] : stable [Diet Modification] : diet modification [Exercise] : exercise [Hypertension] : hypertension [Exercise Regimen] : an exercise regimen [Sodium Restriction] : sodium restriction [Unlikely Cardiac Ischemia (Low Prob.)] : chest pain unlikely to represent cardiac ischemia (low probability) [___ Month(s)] : [unfilled] month(s) [de-identified] : ASA and Statin [de-identified] : on Lipitor [de-identified] : on Cozaar 50/ Diltiazem ; HCTZ 12.5 mg PO daily; Monitor BP  [de-identified] : APCs and some PVCs [FreeTextEntry4] : Mild MARCELO - Refer to Pulmonology

## 2019-01-07 ENCOUNTER — APPOINTMENT (OUTPATIENT)
Dept: INTERNAL MEDICINE | Facility: CLINIC | Age: 54
End: 2019-01-07
Payer: COMMERCIAL

## 2019-01-07 VITALS
TEMPERATURE: 98.4 F | BODY MASS INDEX: 33.74 KG/M2 | HEIGHT: 71 IN | WEIGHT: 241 LBS | DIASTOLIC BLOOD PRESSURE: 84 MMHG | HEART RATE: 65 BPM | SYSTOLIC BLOOD PRESSURE: 150 MMHG | OXYGEN SATURATION: 94 %

## 2019-01-07 PROCEDURE — 99204 OFFICE O/P NEW MOD 45 MIN: CPT

## 2019-01-07 NOTE — HISTORY OF PRESENT ILLNESS
[FreeTextEntry1] : Evaluation for sleep disorder [de-identified] : This is a 53-year-old male with a history of chest pain syndrome. He underwent cardiac catheterization which revealed nonobstructive coronary artery disease. No stent was placed. The patient has a long history of loud snoring. He underwent an outpatient home sleep study on 11 3. This revealed an AHI of 8.9. Patient spit times between 10 and 12 and he awakens at 7 AM. He states he sleeps lightly and tosses and turns all night. He feels very sleepy in the morning. He feels he never gets a good night sleep. He is sleepy during the day with low energy level. His Marshallberg sleepiness score is 11. Patient is known to have loud snoring for many years. It's unclear if he has witnessed apneas. There is a history of hypertension. There is no history of diabetes. He is a nonsmoker. He has one drink per night. Patient is currently 5 feet 11 weight 240 pounds. Of note the patient has lost about 14 or 15 pounds over the past 2 months.

## 2019-01-07 NOTE — ASSESSMENT
[FreeTextEntry1] : Patient with mild sleep apnea based on home sleep study dated 11-3 2018. However patient is very symptomatic with very loud snoring and poor sleep. He is very sleepy during the day and never feels rested in the morning. My concern is that the patient may have more severe sleep apnea then documented on the home study. I would prefer to obtain and in lab sleep study. However patient does not think he can sleep in the lab with his wires attached. I will therefore opt to obtain auto CPAP for CPAP use at home. He will followup with me after one month on the use of CPAP. Patient is also advised to stop drinking.

## 2019-01-07 NOTE — PLAN
[FreeTextEntry1] : We'll arrange for auto CPAP unit. Patient is to see me in followup one month after starting CPAP.

## 2019-03-11 ENCOUNTER — APPOINTMENT (OUTPATIENT)
Dept: INTERNAL MEDICINE | Facility: CLINIC | Age: 54
End: 2019-03-11
Payer: COMMERCIAL

## 2019-03-11 VITALS
BODY MASS INDEX: 32.9 KG/M2 | OXYGEN SATURATION: 95 % | HEART RATE: 68 BPM | DIASTOLIC BLOOD PRESSURE: 80 MMHG | SYSTOLIC BLOOD PRESSURE: 120 MMHG | HEIGHT: 71 IN | WEIGHT: 235 LBS

## 2019-03-11 DIAGNOSIS — G47.33 OBSTRUCTIVE SLEEP APNEA (ADULT) (PEDIATRIC): ICD-10-CM

## 2019-03-11 PROCEDURE — 99214 OFFICE O/P EST MOD 30 MIN: CPT

## 2019-03-11 NOTE — ASSESSMENT
[FreeTextEntry1] : Patient is clearly benefiting from the use of CPAP. He feels much more energetic when he uses CPAP. He now has a new cleaning device and states he will use CPAP every night all night. I will review prior compliance and efficacy data. Thank you

## 2019-03-11 NOTE — HISTORY OF PRESENT ILLNESS
[FreeTextEntry1] : Followup sleep apnea. [de-identified] : Patient now is using CPAP about 4 times per week. At first he was not using it regularly because he had trouble cleaning the device. He recently obtained so clean and is using the machine more. He definitely feels better if he uses CPAP. He has much more energy during the day and sleeps well. He denies snoring with CPAP. He understands that the machine helps him and will start using it more now that he is able to clean it regularly. His weight is unchanged at 235. He's not drinking.

## 2019-09-24 ENCOUNTER — APPOINTMENT (OUTPATIENT)
Dept: HEART AND VASCULAR | Facility: CLINIC | Age: 54
End: 2019-09-24

## 2020-03-09 ENCOUNTER — APPOINTMENT (OUTPATIENT)
Dept: INTERNAL MEDICINE | Facility: CLINIC | Age: 55
End: 2020-03-09
